# Patient Record
Sex: MALE | Race: WHITE
[De-identification: names, ages, dates, MRNs, and addresses within clinical notes are randomized per-mention and may not be internally consistent; named-entity substitution may affect disease eponyms.]

---

## 2019-10-10 ENCOUNTER — HOSPITAL ENCOUNTER (EMERGENCY)
Dept: HOSPITAL 25 - ED | Age: 44
Discharge: HOME | End: 2019-10-10
Payer: COMMERCIAL

## 2019-10-10 VITALS — DIASTOLIC BLOOD PRESSURE: 77 MMHG | SYSTOLIC BLOOD PRESSURE: 91 MMHG

## 2019-10-10 DIAGNOSIS — R60.0: ICD-10-CM

## 2019-10-10 DIAGNOSIS — M70.841: Primary | ICD-10-CM

## 2019-10-10 PROCEDURE — 99282 EMERGENCY DEPT VISIT SF MDM: CPT

## 2019-10-10 NOTE — ED
Upper Extremity Pain





- HPI Summary


HPI Summary: 





patient is an organic , states he was using pruners "all day 

yesterday" (flexing and extending fingers repetitively), developed pain and 

swelling over dorsal aspect of right hand. states can feel "something pull" in 

region of 2nd extensor tendon. denies trauma. 





- History of Current Complaint


Chief Complaint: EDExtremityUpper


Stated Complaint: RIGHT HAND INJURY PER PT


Time Seen by Provider: 10/10/19 17:23





- Allergies/Home Medications


Allergies/Adverse Reactions: 


 Allergies











Allergy/AdvReac Type Severity Reaction Status Date / Time


 


No Known Allergies Allergy   Verified 10/10/19 15:08











Home Medications: 


 Home Medications





NK [No Home Medications Reported]  10/10/19 [History Confirmed 10/10/19]











PMH/Surg Hx/FS Hx/Imm Hx


Infectious Disease History: No


Infectious Disease History: 


   Denies: Traveled Outside the [place] in Last 30 Days





- Social History


Alcohol Use: None


Substance Use Type: Reports: None


Smoking Status (MU): Never Smoked Tobacco





Review of Systems


Positive: Arthralgia, Edema


All Other Systems Reviewed And Are Negative: Yes





Physical Exam


Triage Information Reviewed: Yes


Vital Signs On Initial Exam: 


 Initial Vitals











Temp Pulse Resp BP Pulse Ox


 


 98.1 F   78   16   96/64   98 


 


 10/10/19 15:05  10/10/19 15:05  10/10/19 15:05  10/10/19 15:05  10/10/19 15:05











Vital Signs Reviewed: Yes


Appearance: Positive: Well-Appearing


Skin: Positive: Warm, Skin Color Reflects Adequate Perfusion, Dry


Head/Face: Positive: Normal Head/Face Inspection


Eyes: Positive: Normal


ENT: Positive: Normal ENT inspection


Neck: Positive: Supple


Respiratory/Lung Sounds: Positive: Breath Sounds Present


Cardiovascular: Positive: Normal


Musculoskeletal: Positive: Abnormal @ - swelling over dorsal aspect of right 

hand. no erythyema, cuts, contusions, deformity. n/v intact. tender over region 

of 2nd extensor tendon on ROM testing.


Neurological: Positive: Normal, Sensory/Motor Intact


Psychiatric: Positive: Normal


AVPU Assessment: Alert





Procedures





- Sedation


Patient Received Moderate/Deep Sedation with Procedure: No





Diagnostics





- Vital Signs


 Vital Signs











  Temp Pulse Resp BP Pulse Ox


 


 10/10/19 15:05  98.1 F  78  16  96/64  98














- Laboratory


Lab Statement: Any lab studies that have been ordered have been reviewed, and 

results considered in the medical decision making process.





Course/Dx





- Course


Course Of Treatment: pt given motrin and velcro wrist brace. also given PCP 

referral.





- Diagnoses


Differential Diagnosis/HQI/PQRI: Positive: Bursitis, Other - tendonitis.  

Negative: Arthritis, Fracture (Open), Fracture (Closed), Hematoma, Laceration, 

Osteomyelitis


Provider Diagnoses: 


 Overuse syndrome of hand








Discharge ED





- Sign-Out/Discharge


Documenting (check all that apply): Patient Departure





- Discharge Plan


Condition: Good


Disposition: HOME


Patient Education Materials:  Tendinitis (ED)


Referrals: 


No Primary Care Phys,NOPCP [Primary Care Provider] - 


[facility] [Outside]





- Billing Disposition and Condition


Condition: GOOD


Disposition: Home





- Attestation Statements


Document Initiated by Drake: No